# Patient Record
Sex: FEMALE | Race: BLACK OR AFRICAN AMERICAN | URBAN - METROPOLITAN AREA
[De-identification: names, ages, dates, MRNs, and addresses within clinical notes are randomized per-mention and may not be internally consistent; named-entity substitution may affect disease eponyms.]

---

## 2022-08-01 ENCOUNTER — OFFICE VISIT (OUTPATIENT)
Dept: CARDIOLOGY CLINIC | Age: 22
End: 2022-08-01
Payer: COMMERCIAL

## 2022-08-01 VITALS
HEIGHT: 64 IN | WEIGHT: 230 LBS | OXYGEN SATURATION: 98 % | DIASTOLIC BLOOD PRESSURE: 80 MMHG | SYSTOLIC BLOOD PRESSURE: 112 MMHG | HEART RATE: 82 BPM | BODY MASS INDEX: 39.27 KG/M2

## 2022-08-01 DIAGNOSIS — E66.01 MORBID OBESITY (HCC): ICD-10-CM

## 2022-08-01 DIAGNOSIS — R07.9 CHEST PAIN, UNSPECIFIED TYPE: Primary | ICD-10-CM

## 2022-08-01 PROBLEM — J45.20 MILD INTERMITTENT ASTHMA WITHOUT COMPLICATION: Status: ACTIVE | Noted: 2019-03-14

## 2022-08-01 PROBLEM — F33.2 MAJOR DEPRESSIVE DISORDER, RECURRENT SEVERE WITHOUT PSYCHOTIC FEATURES (HCC): Status: ACTIVE | Noted: 2019-03-14

## 2022-08-01 PROBLEM — F51.04 PSYCHOPHYSIOLOGICAL INSOMNIA: Status: ACTIVE | Noted: 2019-03-14

## 2022-08-01 PROBLEM — F41.9 ANXIETY: Status: ACTIVE | Noted: 2019-03-14

## 2022-08-01 PROCEDURE — 99204 OFFICE O/P NEW MOD 45 MIN: CPT | Performed by: INTERNAL MEDICINE

## 2022-08-01 PROCEDURE — 93000 ELECTROCARDIOGRAM COMPLETE: CPT | Performed by: INTERNAL MEDICINE

## 2022-08-01 RX ORDER — TOPIRAMATE 25 MG/1
50 TABLET ORAL DAILY
COMMUNITY
Start: 2022-07-01

## 2022-08-01 RX ORDER — NAPROXEN 500 MG/1
500 TABLET ORAL 2 TIMES DAILY
COMMUNITY
Start: 2022-05-31

## 2022-08-01 RX ORDER — LANOLIN ALCOHOL/MO/W.PET/CERES
325 CREAM (GRAM) TOPICAL
COMMUNITY

## 2022-08-01 RX ORDER — SUMATRIPTAN 50 MG/1
50 TABLET, FILM COATED ORAL
COMMUNITY

## 2022-08-01 RX ORDER — ZINC GLUCONATE 10 MG
LOZENGE ORAL
COMMUNITY

## 2022-08-01 RX ORDER — BUPROPION HYDROCHLORIDE 150 MG/1
300 TABLET ORAL DAILY
COMMUNITY
Start: 2022-07-01

## 2022-08-01 RX ORDER — ERGOCALCIFEROL 1.25 MG/1
CAPSULE ORAL
COMMUNITY
Start: 2022-07-01

## 2022-08-01 NOTE — LETTER
8/1/2022    Patient: Shar Pino   YOB: 2000   Date of Visit: 8/1/2022     Charli Elizondo NP  [id number] [place] [address]  Via Fax: [phone]    Dear Charli Elizondo NP,      Thank you for referring Ms. Skylar Willson to [address] [place] [address] for evaluation. My notes for this consultation are attached. If you have questions, please do not hesitate to call me. I look forward to following your patient along with you.       Sincerely,    Alex Davenport MD

## 2022-08-01 NOTE — PROGRESS NOTES
[facility]    Ms. Pino 47-year-old female with a history of anxiety, asthma, borderline hypertension and obesity    Patient is here today for cardiac evaluation. She denies prior history of MI or CHF. Patient has been experiencing some chest discomfort as well as palpitation on and off for last 2 months. She feels like the symptoms are usually in the setting of anxiety. However last time her pain lasted for longer period of the time so she had to go to the emergency department. She described this as a sharp stabbing sensation in the chest lasting for couple hours. No nausea vomiting or diaphoresis. No radiation. No exertional symptoms. It resolves itself. No orthopnea or PND. Able to perform active daily living without limitation. Does not perform any regular exercise. No presyncope or syncope. Palpitation happens frequently  Denies any nausea, vomiting, abdominal pain, fever, chills, sputum production. No hematuria or other bleeding complaints    Past Medical History:   Diagnosis Date    Anxiety     Asthma     Borderline hypertension     Obesity        Review of Systems:  Cardiac symptoms as noted above in HPI. All others negative. Denies fatigue, malaise, skin rash, joint pain, blurring vision, photophobia, neck pain, hemoptysis, chronic cough, nausea, vomiting, hematuria, burning micturition, BRBPR, chronic headaches. Current Outpatient Medications   Medication Sig    buPROPion XL (WELLBUTRIN XL) 150 mg tablet Take 300 mg by mouth in the morning.    ergocalciferol (ERGOCALCIFEROL) 1,250 mcg (50,000 unit) capsule     naproxen (NAPROSYN) 500 mg tablet Take 500 mg by mouth two (2) times a day. topiramate (TOPAMAX) 25 mg tablet Take 50 mg by mouth in the morning. ferrous sulfate (Iron) 325 mg (65 mg iron) tablet Take 325 mg by mouth Daily (before breakfast).     PNV/iron/omega3/folic ac/f.a.1 (PRE-NATALIE MULTIVITAMINS/MINERALS PO) Take  by mouth.    magnesium 250 mg tab Take  by mouth. SUMAtriptan (Imitrex) 50 mg tablet Take 50 mg by mouth daily as needed for Migraine. No current facility-administered medications for this visit. No past surgical history on file. Allergies and Sensitivities:  Allergies   Allergen Reactions    Penicillins Hives and Other (comments)    Sulfamethoxazole-Trimethoprim Swelling    Tramadol Nausea and Vomiting       Family History:  No family history on file. Social History:     She  has no history on file for tobacco use. She  has no history on file for alcohol use. Physical Exam:  BP Readings from Last 3 Encounters:   22 112/80         Pulse Readings from Last 3 Encounters:   22 82          Wt Readings from Last 3 Encounters:   22 104.3 kg (230 lb)       Constitutional: Oriented to person, place, and time  HENT: Head: Normocephalic and atraumatic. Eyes: Conjunctivae and extraocular motions are normal.   Neck: No JVD present. Carotid bruit is not appreciated. Cardiovascular: Regular rhythm. No murmur, gallop or rubs appreciated  Lung: Breath sounds normal. No respiratory distress. No ronchi or rales appreciated  Abdominal: No tenderness. No rebound and no guarding. Musculoskeletal: There is no lower extremity edema. No cynosis  Lymphadenopathy:  No cervical or supraclavicular adenopathy appriciated. Neurological: No gross motor deficit noted. Skin: No visible skin rash noted. No Ear discharge noted  Psychiatric: Normal mood and affect. LABS:   @No results found for: WBC, WBCLT, HGBPOC, HGB, HGBP, HCTPOC, HCT, PHCT, RBCH, PLT, MCV, HGBEXT, HCTEXT, PLTEXT  No results found for: NA, K, CL, CO2, GLU, BUN, CREA  No flowsheet data found.   No results found for: ALT  No results found for: HBA1C, OBS1KDSJ, EPN6JJBQ  No results found for: TSH, TSH2, TSH3, TSHP, TSHEXT    EK2022: Sinus rhythm at 82 bpm.  Normal MN and QRS interval.  No ST changes of ischemia. STRESS TEST (EST, PHARM, NUC, ECHO etc)    CATHETERIZATION    IMPRESSION & PLAN:  49-year-old female    Chest pain and palpitation:  In the setting of anxiety episodes. Somewhat atypical for angina. No presyncope or syncope. EKG is unremarkable. We will proceed with treadmill stress test for restratification. Echo to rule out abnormal cardiac structure. Event monitor to rule out any arrhythmia. TSH being checked by PCP according to patient. Obesity:  Weight is 230 pounds with BMI of 39. Importance of diet and exercise was discussed with patient. This plan was discussed with patient who is in agreement. Thank you for allowing me to participate in patient care. Please feel free to call me if you have any question or concern. Bud Lazaro MD  Please note: This document has been produced using voice recognition software. Unrecognized errors in transcription may be present.

## 2022-08-01 NOTE — PROGRESS NOTES
Giselle Arguello presents today for   Chief Complaint   Patient presents with    New Patient     Referred by PCP for chest pain        Giselle Arguello preferred language for health care discussion is english/other. Is someone accompanying this pt? no    Is the patient using any DME equipment during [address]? no    Depression Screening:  3 most recent PHQ Screens 8/1/2022   Little interest or pleasure in doing things Not at all   Feeling down, depressed, irritable, or hopeless Not at all   Total Score PHQ 2 0       Learning Assessment:  Learning Assessment 8/1/2022   PRIMARY LEARNER Patient   PRIMARY LANGUAGE ENGLISH   LEARNER PREFERENCE PRIMARY DEMONSTRATION   ANSWERED BY Patient   RELATIONSHIP SELF       Abuse Screening:  Abuse Screening Questionnaire 8/1/2022   Do you ever feel afraid of your partner? N   Are you in a relationship with someone who physically or mentally threatens you? N   Is it safe for you to go home? Y       Pt currently taking Anticoagulant therapy? no    Coordination of Care:  1. Have you been to the ER, urgent care clinic since your last visit? Hospitalized since your last visit? no    2. Have you seen or consulted any other health care providers outside of the [address] [place] [address] since your last visit? Include any pap smears or colon screening.  no

## 2022-09-12 ENCOUNTER — TELEPHONE (OUTPATIENT)
Dept: CARDIOLOGY CLINIC | Age: 22
End: 2022-09-12

## 2022-09-12 NOTE — TELEPHONE ENCOUNTER
Dr Abram Brandt reviewed the end of service report on the event monitor.      Verbal order and read back per Tiffanie Mittal MD  Within normal limits

## 2022-09-12 NOTE — LETTER
9/14/2022    Ms. Grayson Pino  [address] [place] [address]        Dear Ms. Pino,    We have been unable to reach you by phone to notify you of your test results. Please call our office at [phone] and ask to speak with my nurse in order to explain these results to you and advise you of any recommendations.       Sincerely,      Maribell Askew MD

## 2022-09-19 ENCOUNTER — TELEPHONE (OUTPATIENT)
Dept: CARDIOLOGY CLINIC | Age: 22
End: 2022-09-19

## 2022-09-26 ENCOUNTER — TELEPHONE (OUTPATIENT)
Dept: CARDIOLOGY CLINIC | Age: 22
End: 2022-09-26

## 2022-09-26 NOTE — TELEPHONE ENCOUNTER
----- Message from Korin Barrera MD sent at 9/22/2022 11:54 AM EDT -----  Please inform patient about test result  Appears normal.    Thanks  SP